# Patient Record
Sex: MALE | HISPANIC OR LATINO | Employment: FULL TIME | URBAN - METROPOLITAN AREA
[De-identification: names, ages, dates, MRNs, and addresses within clinical notes are randomized per-mention and may not be internally consistent; named-entity substitution may affect disease eponyms.]

---
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
## 2019-10-19 ENCOUNTER — HOSPITAL ENCOUNTER (OUTPATIENT)
Dept: RADIOLOGY | Facility: MEDICAL CENTER | Age: 19
End: 2019-10-19
Attending: NURSE PRACTITIONER
Payer: COMMERCIAL

## 2019-10-19 ENCOUNTER — OFFICE VISIT (OUTPATIENT)
Dept: URGENT CARE | Facility: MEDICAL CENTER | Age: 19
End: 2019-10-19
Payer: COMMERCIAL

## 2019-10-19 VITALS
DIASTOLIC BLOOD PRESSURE: 68 MMHG | SYSTOLIC BLOOD PRESSURE: 92 MMHG | WEIGHT: 169 LBS | OXYGEN SATURATION: 96 % | HEART RATE: 73 BPM | TEMPERATURE: 98 F | BODY MASS INDEX: 23.14 KG/M2

## 2019-10-19 DIAGNOSIS — R51.9 FACIAL PAIN: ICD-10-CM

## 2019-10-19 DIAGNOSIS — S06.0X1A CONCUSSION WITH LOSS OF CONSCIOUSNESS OF 30 MINUTES OR LESS, INITIAL ENCOUNTER: ICD-10-CM

## 2019-10-19 DIAGNOSIS — S09.90XA INJURY OF HEAD, INITIAL ENCOUNTER: ICD-10-CM

## 2019-10-19 DIAGNOSIS — S00.81XA: ICD-10-CM

## 2019-10-19 DIAGNOSIS — G93.0 ARACHNOID CYST: ICD-10-CM

## 2019-10-19 PROCEDURE — 99214 OFFICE O/P EST MOD 30 MIN: CPT | Performed by: NURSE PRACTITIONER

## 2019-10-19 PROCEDURE — 70450 CT HEAD/BRAIN W/O DYE: CPT

## 2019-10-19 PROCEDURE — 70486 CT MAXILLOFACIAL W/O DYE: CPT

## 2019-10-19 ASSESSMENT — ENCOUNTER SYMPTOMS
DOUBLE VISION: 0
CHILLS: 0
NAUSEA: 0
LOSS OF CONSCIOUSNESS: 1
FALLS: 1
BACK PAIN: 0
FEVER: 0
PALPITATIONS: 0
SORE THROAT: 0
STRIDOR: 0
WHEEZING: 0
VOMITING: 0
DIZZINESS: 0
DIARRHEA: 0
BLURRED VISION: 0
COUGH: 0
ABDOMINAL PAIN: 0
CONSTIPATION: 0
HEADACHES: 0

## 2019-10-19 NOTE — PROGRESS NOTES
Subjective:   Ben Zarco is a 19 y.o. male who presents for Head Injury (loss conciousness, hit head as he fell, had food poisoning and wasnt eating much and got up very fast to cause fainting )        HPI   Patient with new onset head injury that occurred yesterday. States he has recently been sick with stomach flu and when he went to stand up, he fainted and states he hit his head. +LOC. States with mild nausea today - unsure if left over from the stomach flu. Denies changes to vision, numbness or tingling. States with current mild headache, which he has taken Ibuprofen for.      Review of Systems   Constitutional: Negative for chills and fever.   HENT: Negative for congestion, ear discharge, ear pain and sore throat.    Eyes: Negative for blurred vision and double vision.   Respiratory: Negative for cough, wheezing and stridor.    Cardiovascular: Negative for chest pain and palpitations.   Gastrointestinal: Negative for abdominal pain, constipation, diarrhea, nausea and vomiting.   Musculoskeletal: Positive for falls. Negative for back pain and joint pain.   Skin: Negative for itching and rash.        Abrasion to the left face and forehead   Neurological: Positive for loss of consciousness. Negative for dizziness and headaches.   All other systems reviewed and are negative.    Patient's PMH, SocHx, SurgHx, FamHx, Drug allergies and medications reviewed.     Objective:   BP (!) 92/68   Pulse 73   Temp 36.7 °C (98 °F)   Wt 76.7 kg (169 lb)   SpO2 96%   BMI 23.14 kg/m²   Physical Exam   Constitutional: He is oriented to person, place, and time. He appears well-developed and well-nourished. No distress.   HENT:   Head: Normocephalic.   Right Ear: Hearing, tympanic membrane and ear canal normal. Tympanic membrane is not erythematous. No middle ear effusion.   Left Ear: Hearing, tympanic membrane and ear canal normal. Tympanic membrane is not erythematous.  No middle ear effusion.   Nose: No rhinorrhea. Right  sinus exhibits no maxillary sinus tenderness and no frontal sinus tenderness. Left sinus exhibits no maxillary sinus tenderness and no frontal sinus tenderness.   Mouth/Throat: Oropharynx is clear and moist and mucous membranes are normal.   Eyes: Pupils are equal, round, and reactive to light. Conjunctivae, EOM and lids are normal.   Neck: Normal range of motion. No thyromegaly present.   Cardiovascular: Normal rate, regular rhythm and normal heart sounds.   Pulmonary/Chest: Effort normal and breath sounds normal. No respiratory distress. He has no wheezes.   Musculoskeletal:   Moves all 4 extremities equally  Normal gait   Lymphadenopathy:        Head (right side): No submandibular and no tonsillar adenopathy present.        Head (left side): No submandibular and no tonsillar adenopathy present.   Neurological: He is alert and oriented to person, place, and time. He has normal strength. He is not disoriented. No cranial nerve deficit or sensory deficit. He displays a negative Romberg sign. GCS eye subscore is 4. GCS verbal subscore is 5. GCS motor subscore is 6.   Skin: Skin is warm and dry. Abrasion and bruising noted. He is not diaphoretic.        Bruising with superficial abrasion to the left temporal area with TTP.  Small hematoma with superficial abrasion noted to the left forehead near the hairline. No active bleeding.      Psychiatric: He has a normal mood and affect. His speech is normal and behavior is normal. Judgment and thought content normal. He is not actively hallucinating. Cognition and memory are normal. He is attentive.   Vitals reviewed.        Assessment/Plan:   Assessment    1. Injury of head, initial encounter  - CT-HEAD W/O; Future  - CT-MAXILLOFACIAL W/O PLUS RECONS; Future    2. Facial pain  - CT-HEAD W/O; Future  - CT-MAXILLOFACIAL W/O PLUS RECONS; Future    3. Abrasion of face without infection    4. Arachnoid cyst  - REFERRAL TO NEUROSURGERY    5. Concussion with loss of consciousness  "of 30 minutes or less, initial encounter    CT:\"No evidence of skull fracture or intracranial hemorrhage.  Left middle cranial fossa arachnoid cyst.  Minimal chronic maxillary sinus inflammatory changes.\"  Discussed concussion precautions.    Strict ER precautions discussed    Differential diagnosis, natural history, supportive care, and indications for immediate follow-up discussed.     **Please note that all invasive procedures during this visit were performed by myself and/or the Medical Assistant under the supervision of the PA or MD in office**      "

## 2019-10-19 NOTE — LETTER
[facility]  [facility]  [address]  [place] [address]     October 19, 2019    Patient: Ben Zarco   YOB: 2000   Date of Visit: 10/19/2019       To Whom It May Concern:    Ben Zarco was seen and treated in our department on 10/19/2019.     Sincerely,     Yuli Bergeron, Med Ass't

## 2020-02-07 ENCOUNTER — OCCUPATIONAL MEDICINE (OUTPATIENT)
Dept: URGENT CARE | Facility: CLINIC | Age: 20
End: 2020-02-07
Payer: COMMERCIAL

## 2020-02-07 ENCOUNTER — APPOINTMENT (OUTPATIENT)
Dept: RADIOLOGY | Facility: IMAGING CENTER | Age: 20
End: 2020-02-07
Attending: NURSE PRACTITIONER
Payer: COMMERCIAL

## 2020-02-07 VITALS
RESPIRATION RATE: 16 BRPM | WEIGHT: 180 LBS | HEIGHT: 74 IN | SYSTOLIC BLOOD PRESSURE: 100 MMHG | BODY MASS INDEX: 23.1 KG/M2 | OXYGEN SATURATION: 96 % | HEART RATE: 79 BPM | DIASTOLIC BLOOD PRESSURE: 72 MMHG | TEMPERATURE: 98.8 F

## 2020-02-07 DIAGNOSIS — R06.00 DYSPNEA, UNSPECIFIED TYPE: ICD-10-CM

## 2020-02-07 PROCEDURE — 71046 X-RAY EXAM CHEST 2 VIEWS: CPT | Mod: TC | Performed by: NURSE PRACTITIONER

## 2020-02-07 PROCEDURE — 99203 OFFICE O/P NEW LOW 30 MIN: CPT | Performed by: NURSE PRACTITIONER

## 2020-02-07 RX ORDER — ALBUTEROL SULFATE 90 UG/1
2 AEROSOL, METERED RESPIRATORY (INHALATION) EVERY 6 HOURS PRN
Qty: 8.5 G | Refills: 0 | Status: SHIPPED | OUTPATIENT
Start: 2020-02-07

## 2020-02-07 ASSESSMENT — ENCOUNTER SYMPTOMS
HEADACHES: 0
CHILLS: 0
SPUTUM PRODUCTION: 0
ORTHOPNEA: 0
WHEEZING: 0
DIZZINESS: 0
SYNCOPE: 0
FEVER: 0
HEMOPTYSIS: 0
COUGH: 0
SORE THROAT: 0
SHORTNESS OF BREATH: 1

## 2020-02-07 NOTE — LETTER
[facility] [address] - [place] [address]  Phone:  [phone] - Fax:  [phone]   [facility] Progress Report and Disability Certification  Date of Service: 2/7/2020   No Show:  No  Date / Time of Next Visit: 2/14/2020 Occupational Medicine   Claim Information   Patient Name: Ben Zarco  Claim Number:     Employer: [employer]  Date of Injury: 2/6/2020     Insurer / TPA: [employer]  ID / SSN:     Occupation: FIELD TECHINICIAN  Diagnosis: The encounter diagnosis was Dyspnea, unspecified type.    Medical Information   Related to Industrial Injury? Yes    Subjective Complaints:  DOI: 6 months ago (August, 2019). 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 He does wear a mask when he is doing this but each time he is working in these situations, it is becoming harder and harder to breath. Patient states that he used to have asthma as a child, but outgrew the symptoms. He states yesterday, was especially bad and he was having pain when he was trying to take deep breaths. Patient admits to wheezing when he is in these situations and has tried to hold his breath to try and decrease what he is breathing in. Patient states that he is exposed to fiberglass particles as well as dirt. Has not taken anything for his symptoms. Denies second job.   Shortness of Breath   This is a new problem. Episode onset: 6 months ago. The problem occurs intermittently (at work when in atte-channel and crawl spaces). The problem has been gradually worsening. Associated symptoms include chest pain. Pertinent negatives include no fever, headaches, hemoptysis, orthopnea, sore throat, sputum production, syncope or wheezing. The symptoms are aggravated by occupational exposure. Associated symptoms comments: Patient admits to chest pain when he tries to take a very deep breath when 1 of these episodes is happening. He has tried nothing  for the symptoms. His past medical history is significant for asthma.   Review of Systems   Constitutional: Negative for chills, fever and malaise/fatigue.   HENT: Negative for sore throat.    Respiratory: Positive for shortness of breath. Negative for cough, hemoptysis, sputum production and wheezing.    Cardiovascular: Positive for chest pain. Negative for orthopnea and syncope.   Neurological: Negative for dizziness and headaches.        Objective Findings: Physical Exam  Vitals signs reviewed.   Constitutional:       Appearance: Normal appearance.   HENT:      Right Ear: Tympanic membrane, ear canal and external ear normal.      Left Ear: Tympanic membrane, ear canal and external ear normal.      Nose: Nose normal.      Mouth/Throat:      Lips: Pink.      Mouth: Mucous membranes are moist.      Pharynx: Uvula midline.   Cardiovascular:      Rate and Rhythm: Normal rate and regular rhythm.      Heart sounds: Normal heart sounds.   Pulmonary:      Effort: Pulmonary effort is normal.      Breath sounds: Normal breath sounds.   Skin:     General: Skin is warm.   Neurological:      Mental Status: He is alert and oriented to person, place, and time.   Psychiatric:         Mood and Affect: Mood normal.         Behavior: Behavior normal.         Thought Content: Thought content normal.         Judgment: Judgment normal.        Pre-Existing Condition(s): Asthma as a child   Assessment:   Initial Visit    Status: Additional Care Required  Permanent Disability:No    Plan: Medication  Comments:Albuterol inhaler, Continue to use mask while at work, if acute episode happens again and albuterol inhaler is ineffective, return to Urgent care for further work up and evaluation, if he hasn't seen occupational medicine yet    Diagnostics: X-ray  Comments:Negative chest xray findings    Comments:       Disability Information   Status: Released to Full Duty    From:  2/7/2020  Through: 2/14/2020 Restrictions are: Temporary   Physical  Restrictions   Sitting:    Standing:    Stooping:    Bending:      Squatting:    Walking:    Climbing:    Pushing:      Pulling:    Other:    Reaching Above Shoulder (L):   Reaching Above Shoulder (R):       Reaching Below Shoulder (L):    Reaching Below Shoulder (R):      Not to exceed Weight Limits   Carrying(hrs):   Weight Limit(lb):   Lifting(hrs):   Weight  Limit(lb):     Comments: Patient needs to follow up with Occupational Medicine for further work up and evaluation.     Repetitive Actions   Hands: i.e. Fine Manipulations from Grasping:     Feet: i.e. Operating Foot Controls:     Driving / Operate Machinery:     Physician Name: ANY Medina Physician Signature: MARIN Louis e-Signature: Dr. Art Nagel, Medical Director   Clinic Name / Location: [address]  [place] [address] Clinic Phone Number: Dept: [phone]   Appointment Time: 1:25 Pm Visit Start Time: 2:14 PM   Check-In Time:  1:35 Pm Visit Discharge Time:  4:06pm   Original-Treating Physician or Chiropractor    Page 2-Insurer/TPA    Page 3-Employer    Page 4-Employee

## 2020-02-07 NOTE — PROGRESS NOTES
Subjective:      Ben Zarco is a 19 y.o. male who presents with Shortness of Breath (feels like he cant take a deep breat after working in atics or crawl spaces )      DOI: 6 months ago (August, 2019). 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 He does wear a mask when he is doing this but each time he is working in these situations, it is becoming harder and harder to breath. Patient states that he used to have asthma as a child, but outgrew the symptoms. He states yesterday, was especially bad and he was having pain when he was trying to take deep breaths. Patient admits to wheezing when he is in these situations and has tried to hold his breath to try and decrease what he is breathing in. Patient states that he is exposed to fiberglass particles as well as dirt. Has not taken anything for his symptoms. Denies second job.      Shortness of Breath   This is a new problem. Episode onset: 6 months ago. The problem occurs intermittently (at work when in attics and crawl spaces). The problem has been gradually worsening. Associated symptoms include chest pain. Pertinent negatives include no fever, headaches, hemoptysis, orthopnea, sore throat, sputum production, syncope or wheezing. The symptoms are aggravated by occupational exposure. Associated symptoms comments: Patient admits to chest pain when he tries to take a very deep breath when 1 of these episodes is happening. He has tried nothing for the symptoms. His past medical history is significant for asthma.       Review of Systems   Constitutional: Negative for chills, fever and malaise/fatigue.   HENT: Negative for sore throat.    Respiratory: Positive for shortness of breath. Negative for cough, hemoptysis, sputum production and wheezing.    Cardiovascular: Positive for chest pain. Negative for orthopnea and syncope.   Neurological: Negative for dizziness and headaches.     PMH: No pertinent past medical history to this  "problem  MEDS: Medications were reviewed in Epic  ALLERGIES: Allergies were reviewed in [facility]  SOCHX: Works at [employer]  FH: No pertinent family history to this problem          Objective:     /72   Pulse 79   Temp 37.1 °C (98.8 °F) (Temporal)   Resp 16   Ht 1.88 m (6' 2\")   Wt 81.6 kg (180 lb)   SpO2 96%   BMI 23.11 kg/m²      Physical Exam  Vitals signs reviewed.   Constitutional:       Appearance: Normal appearance.   HENT:      Right Ear: Tympanic membrane, ear canal and external ear normal.      Left Ear: Tympanic membrane, ear canal and external ear normal.      Nose: Nose normal.      Mouth/Throat:      Lips: Pink.      Mouth: Mucous membranes are moist.      Pharynx: Uvula midline.   Cardiovascular:      Rate and Rhythm: Normal rate and regular rhythm.      Heart sounds: Normal heart sounds.   Pulmonary:      Effort: Pulmonary effort is normal.      Breath sounds: Normal breath sounds.   Skin:     General: Skin is warm.   Neurological:      Mental Status: He is alert and oriented to person, place, and time.   Psychiatric:         Mood and Affect: Mood normal.         Behavior: Behavior normal.         Thought Content: Thought content normal.         Judgment: Judgment normal.                 Assessment/Plan:       1. Dyspnea, unspecified type  - DX-CHEST-2 VIEWS;  - Negative chest series    Follow up with occupational medicine in one week     See MEÑO 39 and C 4        "

## 2020-02-07 NOTE — LETTER
"EMPLOYEE’S CLAIM FOR COMPENSATION/ REPORT OF INITIAL TREATMENT  FORM C-4    EMPLOYEE’S CLAIM - PROVIDE ALL INFORMATION REQUESTED   First Name  Ben Last Name  Haroldo Birthdate                    2000                Sex  male Claim Number   Home Address  [address] Age  19 y.o. 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 (if applicable)  WORKING IN [employer] / [place]    How did this injury or occupational disease occur? (Be specific an answer in detail. Use additional sheet if necessary)  WHEN WE WORK IN [employer] THERE'S FIBERGLASS INSULATION WE WORK IN AND IT GETS KICKED UP INTO THE AIR WHEN DISTURBED IN CRAWL SPACES, DIRT/DUST IS IN OUR WORK SPACE KICKED AROUND   If you believe that you have an occupational disease, when did you first have knowledge of the disability and it relationship to your employment?  WITHIN LAST 6 MONTHS, MICKY GETS WORSE Witnesses to the Accident  ROMMEL MAHMOOD      Nature of Injury or Occupational Disease  Asphyxiation  Part(s) of Body Injured or Affected  Chest, Lungs, N/A    I certify that the above " is true and correct to the best of my knowledge and that I have provided this information in order to obtain the benefits of [place]  I hereby authorize any physician, chiropractor, surgeon, practitioner, or other person, any hospital, including [facility] or [facility], any medical service organization, any insurance company, or other institution or organization to release to each other, any medical or other information, including benefits paid or payable, pertinent to this injury or disease, except information relative to diagnosis, treatment and/or counseling for AIDS, psychological conditions, alcohol or controlled substances, for which I must give specific authorization.  A Photostat of this authorization shall be as valid as the original.     Date:2-7-2020   Place:[facility]   Employee’s Signature   THIS REPORT MUST BE COMPLETED AND MAILED WITHIN 3 WORKING DAYS OF TREATMENT   Place  [facility]  Name of Facility  [facility]   Date  2/7/2020 Diagnosis  (R06.00) Dyspnea, unspecified type Is there evidence the injured employee was under the influence of alcohol and/or another controlled substance at the time of accident?   Hour  2:14 PM Description of Injury or Disease  The encounter diagnosis was Dyspnea, unspecified type. No   Treatment  Albuterol inhaler, Continue to use mask while at work, if acute episode happens again and albuterol inhaler is ineffective, return to Urgent care for further work up and evaluation, if he hasn't seen occupational medicine yet  Have you advised the patient to remain off work five days or more? No   X-Ray Findings  Negative   If Yes   From Date  To Date      From information given by the employee, together with medical evidence, can you directly connect this injury or occupational disease as job incurred?  Yes If No Full Duty Yes Modified  "Duty      Is additional medical care by a physician indicated?  Yes  Comments:Further work up and evaluation by Occupational Medicine If Modified Duty, Specify any Limitations / Restrictions      Do you know of any previous injury or disease contributing to this condition or occupational disease?                            Yes  Comments:Asthma   Date  2/7/2020 Print Doctor’s Name ANY Medina I certify the employer’s copy of  this form was mailed on:   Address  [address] Insurer’s Use Only     [facility] Zip  [address]    Provider’s Tax ID Number  [id number] Telephone  Dept: [phone]        e-MARIN Maurer   e-Signature: Dr. Art Nagel,   Medical Director Degree  MD        ORIGINAL-TREATING PHYSICIAN OR CHIROPRACTOR    PAGE 2-INSURER/TPA    PAGE 3-EMPLOYER    PAGE 4-EMPLOYEE             Form C-4 (rev.10/07)              BRIEF DESCRIPTION OF RIGHTS AND BENEFITS  (Pursuant to [place] 616[place])    Notice of Injury or Occupational Disease (Incident Report Form C-1): If an injury or occupational disease (OD) arises out of and in the course of employment, you must provide written notice to your employer as soon as practicable, but no later than 7 days after the accident or OD. Your employer shall maintain a sufficient supply of the required forms.    Claim for Compensation (Form C-4): If medical treatment is sought, the form C-4 is available at the place of initial treatment. A completed \"Claim for Compensation\" (Form C-4) must be filed within 90 days after an accident or OD. The treating physician or chiropractor must, within 3 working days after treatment, complete and mail to the employer, the employer's insurer and third-party , the Claim for Compensation.    Medical Treatment: If you require medical treatment for your on-the-job injury or OD, you may be required to select a physician or chiropractor from a list provided by your " workers’ compensation insurer, if it has contracted with an Organization for Managed Care (MCO) or Preferred Provider Organization (PPO) or providers of health care. If your employer has not entered into a contract with an MCO or PPO, you may select a physician or chiropractor from the Panel of Physicians and Chiropractors. Any medical costs related to your industrial injury or OD will be paid by your insurer.    Temporary Total Disability (TTD): If your doctor has certified that you are unable to work for a period of at least 5 consecutive days, or 5 cumulative days in a 20-day period, or places restrictions on you that your employer does not accommodate, you may be entitled to TTD compensation.    Temporary Partial Disability (TPD): If the wage you receive upon reemployment is less than the compensation for TTD to which you are entitled, the insurer may be required to pay you TPD compensation to make up the difference. TPD can only be paid for a maximum of 24 months.    Permanent Partial Disability (PPD): When your medical condition is stable and there is an indication of a PPD as a result of your injury or OD, within 30 days, your insurer must arrange for an evaluation by a rating physician or chiropractor to determine the degree of your PPD. The amount of your PPD award depends on the date of injury, the results of the PPD evaluation and your age and wage.    Permanent Total Disability (PTD): If you are medically certified by a treating physician or chiropractor as permanently and totally disabled and have been granted a PTD status by your insurer, you are entitled to receive monthly benefits not to exceed 66 2/3% of your average monthly wage. The amount of your PTD payments is subject to reduction if you previously received a PPD award.    Vocational Rehabilitation Services: You may be eligible for vocational rehabilitation services if you are unable to return to the job due to a permanent physical impairment  or permanent restrictions as a result of your injury or occupational disease.    Transportation and Per Linda Reimbursement: You may be eligible for travel expenses and per linda associated with medical treatment.    Reopening: You may be able to reopen your claim if your condition worsens after claim closure.    Appeal Process: If you disagree with a written determination issued by the insurer or the insurer does not respond to your request, you may appeal to the Department of Administration, , by following the instructions contained in your determination letter. You must appeal the determination within 70 days from the date of the determination letter at [address], [address], [place], [place] [address], or [address], [address], [place], [place] [address]. If you disagree with the  decision, you may appeal to the Department of Administration, . You must file your appeal within 30 days from the date of the  decision letter at [address], [address], [place], [place] [address], or [address], [address], [place], [place] [address]. If you disagree with a decision of an , you may file a petition for judicial review with the District Court. You must do so within 30 days of the Appeal Officer’s decision. You may be represented by an  at your own expense or you may contact the [facility] for possible representation.    [place]  for Injured Workers (NAIW): If you disagree with a  decision, you may request that NAIW represent you without charge at an  Hearing. For information regarding denial of benefits, you may contact the [facility] at: [address], [address], [place] [address], [phone], or [address], [address], [place] [address], [phone]    To File a Complaint with the Division: If you wish to file a complaint with the  of  the Division of Industrial Relations (DIR),  please contact the Workers’ Compensation Section, [address], [address], [place], [place] [address], telephone (925) [phone], or [address], [address], [place], [place] [address], telephone [phone]) [phone].    For assistance with Workers’ Compensation Issues: You may contact the Office of the Governor Consumer Health Assistance, [address] [place] [address], [address], [place], [place] [address], Toll Free 1-412.651.4075, Web site: http://[employer].[facility].[place]., E-mail [email or web address].[place].                   __________________________________________________________________                                                     _________        Employee Name / Signature                                                                                                                                              Date                                                                                                                                                                                                     D-2 (rev. 06/18)

## 2020-10-16 ENCOUNTER — OFFICE VISIT (OUTPATIENT)
Dept: URGENT CARE | Facility: PHYSICIAN GROUP | Age: 20
End: 2020-10-16

## 2020-10-16 ENCOUNTER — NON-PROVIDER VISIT (OUTPATIENT)
Dept: URGENT CARE | Facility: PHYSICIAN GROUP | Age: 20
End: 2020-10-16

## 2020-10-16 VITALS
HEIGHT: 73 IN | TEMPERATURE: 97.8 F | HEART RATE: 61 BPM | SYSTOLIC BLOOD PRESSURE: 108 MMHG | WEIGHT: 170 LBS | DIASTOLIC BLOOD PRESSURE: 78 MMHG | BODY MASS INDEX: 22.53 KG/M2 | RESPIRATION RATE: 14 BRPM | OXYGEN SATURATION: 98 %

## 2020-10-16 DIAGNOSIS — Z02.89 ENCOUNTER FOR EXAMINATION REQUIRED BY DEPARTMENT OF TRANSPORTATION (DOT): ICD-10-CM

## 2020-10-16 PROCEDURE — 7100 PR DOT PHYSICAL: Performed by: PHYSICIAN ASSISTANT

## 2020-10-16 PROCEDURE — 8907 PR URINE COLLECT ONLY: Performed by: PHYSICIAN ASSISTANT

## 2020-10-16 NOTE — PROGRESS NOTES
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 No disqualifying blood pressure, medications, or conditions. Exam normal.